# Patient Record
(demographics unavailable — no encounter records)

---

## 2025-03-16 NOTE — HISTORY OF PRESENT ILLNESS
[FreeTextEntry1] : Ms. RICHARDSON ARROYO is a 22 year-old female who presents for initial endocrine evaluation. Patient presents with regard to possibility of PCOS. Patient here with her mother Concerned re unwanted hair  Additional medical history includes that of ADHD, OCD and Anxiety. -worse since covid.  Ros:  Menses off by around one week each month Notes excess hair growth face chest, abdomen chest waxes about every 2-3 weeks. No scalp hair thinning Acne in neck region and some on back worse in facial region around menses. Has gained over 20 lbs since college started.    ____________________________________________________________________________________________________ Surgeries:  Allergies: PCN and bees  Additional Medications: Fluvoxamine 200mg hs, Propranolol prn for anxiety, Clomiperene 25  mg once daily, and Metformin 500 mg dailyt adn Vitamin d 5,000  Metformin started about November 2024 per gyn Dr. Subramanian.  Has tolerated Metformin well.  Supplements:  Diet:  Eats two meals epr day no breakfast  lunch taco, burger sald sansdwdich  sanck  sookie fruit Then dinner chicken wsoup vegetables, salad Evening snack ice cream or pretazlea bananan with nutella peanut butter yogurt or a cookie  Saw dietitian-did not have a good experience over a 6 month time period.  Social Hx:  FHx:  Mom dx with type 2 Dm about 2 years ago-was on Metfoarmin , then Ozempic. Mom also with hypertensionGGrandparents with hypertension  Clas 2x per week some days works at [employer]  Labs from Dr. Judah Lane: Vit D 25-OH was 21 CMP, CBC, B-12 Foalte adn IRon all wnl as was TSH  at 2.1 adn A1c at 5.1%.  Labs from Dr. Chi form 10/31/2024  Totalt test  Free test 7.8 Insulin fasting 20  LH 2.6  FSH  4.8 DHEAS 474  Does note significant fatigue. wakes up tired. Goes to bed at 2 am-mom ffeels her mind is racing given her anxiety. Then goes into deep sleep.  LMP about 3.weeks ago.  Did speak with

## 2025-07-17 NOTE — HISTORY OF PRESENT ILLNESS
[FreeTextEntry1] : Ms. RICHARDSON ARROYO is a 22-year-old female who returns for follow-up endocrine reevaluation with regard to a history of PCOS. Too has a history of Hashimoto's   Additional medical history includes that of ADHD, OCD and Anxiety. -worse since covid.  She is now taking Metformin  mg two tabs with dinner.  Menses off by around one week each month. Denies menses skipping months. Notes excess hair growth face chest, abdomen chest. Concerned re unwanted hair. No change while on Metformin. Waxes about every 2-3 weeks. No scalp hair thinning. Acne in neck region and some on back worse in facial region around menses. Has gained over 20 lbs since college started.   Eats two meals per day, skips breakfast Lunch:  taco, burger, salad, or sandwich Snack: Cookies, fruits Dinner chicken soup, vegetables, salad Evening snack: ice cream or pretzel, bananas with nutella peanut butter, yogurt or a cookie  Saw dietitian-did not have a good experience over a 6-month time period.  Class 2x per week. Some days works at [employer] in [place]. 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  Does note significant fatigue. Sleeps well at night. Sometimes goes to bed late. Then goes into deep sleep. ____________________________________________________________________________________________________  Additional Medications: Fluvoxamine 200 mg hs, Propranolol prn for anxiety, Clomipramine 25 mg once daily, Vitamin D3 50,000 IU weekly and now Vyvanse 30 mg per Dr. Downing.

## 2025-07-17 NOTE — HISTORY OF PRESENT ILLNESS
[FreeTextEntry1] : Ms. RICHARDSON ARROYO is a 22-year-old female who returns for follow-up endocrine reevaluation with regard to a history of PCOS. Too has a history of Hashimoto's   Additional medical history includes that of ADHD, OCD and Anxiety. -worse since covid.  She is now taking Metformin  mg two tabs with dinner.  Menses off by around one week each month. Denies menses skipping months. Notes excess hair growth face chest, abdomen chest. Concerned re unwanted hair. No change while on Metformin. Waxes about every 2-3 weeks. No scalp hair thinning. Acne in neck region and some on back worse in facial region around menses. Has gained over 20 lbs since college started.   Eats two meals per day, skips breakfast Lunch:  taco, burger, salad, or sandwich Snack: Cookies, fruits Dinner chicken soup, vegetables, salad Evening snack: ice cream or pretzel, bananas with nutella peanut butter, yogurt or a cookie  Saw dietitian-did not have a good experience over a 6-month time period.  Class 2x per week. Some days works at [employer] in [place]. 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  Does note significant fatigue. Sleeps well at night. Sometimes goes to bed late. Then goes into deep sleep. ____________________________________________________________________________________________________  Additional Medications: Fluvoxamine 200 mg hs, Propranolol prn for anxiety, Clomipramine 25 mg once daily, Vitamin D3 50,000 IU weekly and now Vyvanse 30 mg per Dr. Downing.

## 2025-07-17 NOTE — ADDENDUM
[FreeTextEntry1] : Blood will be drawn in office today.  This note was written by Daniella Martinez on 07/17/2025 acting as medical scribe for Dr. Hayder Nur. I, Dr. Hayder Nur, have read and attest that all the information, medical decision making and discharge instructions within are true and accurate.